# Patient Record
Sex: FEMALE | Race: BLACK OR AFRICAN AMERICAN | NOT HISPANIC OR LATINO
[De-identification: names, ages, dates, MRNs, and addresses within clinical notes are randomized per-mention and may not be internally consistent; named-entity substitution may affect disease eponyms.]

---

## 2021-02-25 PROBLEM — Z00.00 ENCOUNTER FOR PREVENTIVE HEALTH EXAMINATION: Status: ACTIVE | Noted: 2021-02-25

## 2021-03-23 ENCOUNTER — APPOINTMENT (OUTPATIENT)
Dept: VASCULAR SURGERY | Facility: CLINIC | Age: 86
End: 2021-03-23
Payer: MEDICARE

## 2021-03-23 PROCEDURE — 99203 OFFICE O/P NEW LOW 30 MIN: CPT

## 2021-03-23 PROCEDURE — 93986 DUP-SCAN HEMO COMPL UNI STD: CPT

## 2021-03-23 NOTE — PHYSICAL EXAM
[JVD] : no jugular venous distention  [Normal Breath Sounds] : Normal breath sounds [Normal Rate and Rhythm] : normal rate and rhythm [2+] : left 2+ [Ankle Swelling (On Exam)] : not present [Varicose Veins Of Lower Extremities] : not present [] : not present [Abdomen Tenderness] : ~T ~M Abdominal tenderness [No Rash or Lesion] : No rash or lesion [Alert] : alert [Calm] : calm [de-identified] : Appears well

## 2021-03-23 NOTE — ASSESSMENT
[FreeTextEntry1] : Patient underwent a right arm vein mapping which shows a poor cephalic and basilic vein.  Given the patient's age will place a right arm AV graft.

## 2021-03-23 NOTE — HISTORY OF PRESENT ILLNESS
[FreeTextEntry1] : 89-year-old female currently on hemodialysis via right-sided permacath for the past several months presents to the office for consultation for permanent hemodialysis access.  Patient is right-handed however, she has placement of a left-sided pacemaker.

## 2021-04-26 ENCOUNTER — OUTPATIENT (OUTPATIENT)
Dept: OUTPATIENT SERVICES | Facility: HOSPITAL | Age: 86
LOS: 1 days | End: 2021-04-26
Payer: MEDICARE

## 2021-04-26 VITALS
WEIGHT: 145.28 LBS | SYSTOLIC BLOOD PRESSURE: 137 MMHG | OXYGEN SATURATION: 99 % | HEART RATE: 71 BPM | HEIGHT: 59 IN | TEMPERATURE: 98 F | RESPIRATION RATE: 18 BRPM | DIASTOLIC BLOOD PRESSURE: 79 MMHG

## 2021-04-26 DIAGNOSIS — Z98.890 OTHER SPECIFIED POSTPROCEDURAL STATES: Chronic | ICD-10-CM

## 2021-04-26 DIAGNOSIS — N18.6 END STAGE RENAL DISEASE: ICD-10-CM

## 2021-04-26 DIAGNOSIS — I48.91 UNSPECIFIED ATRIAL FIBRILLATION: ICD-10-CM

## 2021-04-26 DIAGNOSIS — E11.9 TYPE 2 DIABETES MELLITUS WITHOUT COMPLICATIONS: ICD-10-CM

## 2021-04-26 DIAGNOSIS — Z01.818 ENCOUNTER FOR OTHER PREPROCEDURAL EXAMINATION: ICD-10-CM

## 2021-04-26 PROCEDURE — G0463: CPT

## 2021-04-26 RX ORDER — SODIUM CHLORIDE 9 MG/ML
3 INJECTION INTRAMUSCULAR; INTRAVENOUS; SUBCUTANEOUS EVERY 8 HOURS
Refills: 0 | Status: DISCONTINUED | OUTPATIENT
Start: 2021-05-05 | End: 2021-05-19

## 2021-04-26 RX ORDER — LIDOCAINE HCL 20 MG/ML
0.2 VIAL (ML) INJECTION ONCE
Refills: 0 | Status: DISCONTINUED | OUTPATIENT
Start: 2021-05-05 | End: 2021-05-19

## 2021-04-26 RX ORDER — CHLORHEXIDINE GLUCONATE 213 G/1000ML
1 SOLUTION TOPICAL ONCE
Refills: 0 | Status: DISCONTINUED | OUTPATIENT
Start: 2021-05-05 | End: 2021-05-19

## 2021-04-26 NOTE — H&P PST ADULT - HISTORY OF PRESENT ILLNESS
This is a 89 y/o female PMH Afib, S/P ablation X 2 ?2010, S/P PPM 2018, chronic CHF, hypertension, diabetes type 2, ESRD, started dialysis in November via vascath.  Presents today for right arm AV graft placement. This is a 89 y/o female PMH Afib, S/P ablation X 2 ?2010, S/P [employer] PPM 2018 (no card), chronic CHF, hypertension, diabetes type 2, ESRD, started dialysis in November via vascath.  Presents today for right arm AV graft placement. This is a 89 y/o female PMH Afib, S/P ablation X 2 ?2010/2011, S/P [employer] PPM 2018 (no card), chronic CHF, hypertension, diabetes type 2, ESRD, started dialysis in November via vascat on Tuesdays, Thursdays and Saturdays.  Presents today for right arm AV graft placement.

## 2021-04-26 NOTE — H&P PST ADULT - NSICDXPASTMEDICALHX_GEN_ALL_CORE_FT
PAST MEDICAL HISTORY:  Atrial fibrillation     ESRD on dialysis     History of chronic CHF     HTN (hypertension)     Type 2 diabetes mellitus

## 2021-04-26 NOTE — H&P PST ADULT - NSICDXPROBLEM_GEN_ALL_CORE_FT
PROBLEM DIAGNOSES  Problem: ESRD on dialysis  Assessment and Plan: Right arm AV graft placement    Problem: DM (diabetes mellitus)  Assessment and Plan: Instructed to hold repaglinide the morning of surgery    Problem: Atrial fibrillation  Assessment and Plan: Will hold eliquis 2 days prior to surgery       PROBLEM DIAGNOSES  Problem: ESRD on dialysis  Assessment and Plan: Right arm AV graft placement, unable to obtain labs at [facility], called HD center to have labs drawn with dialysis    Problem: DM (diabetes mellitus)  Assessment and Plan: Instructed to hold repaglinide the morning of surgery    Problem: Atrial fibrillation  Assessment and Plan: Will hold eliquis 2 days prior to surgery

## 2021-04-28 PROBLEM — N18.6 END STAGE RENAL DISEASE: Chronic | Status: ACTIVE | Noted: 2021-04-26

## 2021-04-28 PROBLEM — E11.9 TYPE 2 DIABETES MELLITUS WITHOUT COMPLICATIONS: Chronic | Status: ACTIVE | Noted: 2021-04-26

## 2021-04-28 PROBLEM — Z86.79 PERSONAL HISTORY OF OTHER DISEASES OF THE CIRCULATORY SYSTEM: Chronic | Status: ACTIVE | Noted: 2021-04-26

## 2021-04-28 PROBLEM — I48.91 UNSPECIFIED ATRIAL FIBRILLATION: Chronic | Status: ACTIVE | Noted: 2021-04-26

## 2021-04-28 PROBLEM — I10 ESSENTIAL (PRIMARY) HYPERTENSION: Chronic | Status: ACTIVE | Noted: 2021-04-26

## 2021-05-02 ENCOUNTER — OUTPATIENT (OUTPATIENT)
Dept: OUTPATIENT SERVICES | Facility: HOSPITAL | Age: 86
LOS: 1 days | End: 2021-05-02
Payer: MEDICARE

## 2021-05-02 DIAGNOSIS — Z98.890 OTHER SPECIFIED POSTPROCEDURAL STATES: Chronic | ICD-10-CM

## 2021-05-02 DIAGNOSIS — Z11.52 ENCOUNTER FOR SCREENING FOR COVID-19: ICD-10-CM

## 2021-05-02 LAB — SARS-COV-2 RNA SPEC QL NAA+PROBE: SIGNIFICANT CHANGE UP

## 2021-05-02 PROCEDURE — C9803: CPT

## 2021-05-02 PROCEDURE — U0005: CPT

## 2021-05-02 PROCEDURE — U0003: CPT

## 2021-05-04 ENCOUNTER — TRANSCRIPTION ENCOUNTER (OUTPATIENT)
Age: 86
End: 2021-05-04

## 2021-05-05 ENCOUNTER — APPOINTMENT (OUTPATIENT)
Dept: VASCULAR SURGERY | Facility: HOSPITAL | Age: 86
End: 2021-05-05
Payer: MEDICARE

## 2021-05-05 ENCOUNTER — OUTPATIENT (OUTPATIENT)
Dept: OUTPATIENT SERVICES | Facility: HOSPITAL | Age: 86
LOS: 1 days | End: 2021-05-05
Payer: SELF-PAY

## 2021-05-05 VITALS
HEIGHT: 59 IN | WEIGHT: 145.28 LBS | OXYGEN SATURATION: 100 % | DIASTOLIC BLOOD PRESSURE: 75 MMHG | TEMPERATURE: 98 F | HEART RATE: 73 BPM | SYSTOLIC BLOOD PRESSURE: 118 MMHG | RESPIRATION RATE: 18 BRPM

## 2021-05-05 VITALS
DIASTOLIC BLOOD PRESSURE: 61 MMHG | SYSTOLIC BLOOD PRESSURE: 130 MMHG | RESPIRATION RATE: 14 BRPM | HEART RATE: 79 BPM | OXYGEN SATURATION: 100 %

## 2021-05-05 DIAGNOSIS — Z98.890 OTHER SPECIFIED POSTPROCEDURAL STATES: Chronic | ICD-10-CM

## 2021-05-05 DIAGNOSIS — N18.6 END STAGE RENAL DISEASE: ICD-10-CM

## 2021-05-05 LAB
BASE EXCESS BLDV CALC-SCNC: 4.6 MMOL/L — HIGH (ref -2–2)
CA-I SERPL-SCNC: 1.14 MMOL/L — SIGNIFICANT CHANGE UP (ref 1.12–1.3)
CHLORIDE BLDV-SCNC: 101 MMOL/L — SIGNIFICANT CHANGE UP (ref 96–108)
CO2 BLDV-SCNC: 33 MMOL/L — HIGH (ref 22–30)
GAS PNL BLDV: 132 MMOL/L — LOW (ref 135–145)
GAS PNL BLDV: SIGNIFICANT CHANGE UP
GAS PNL BLDV: SIGNIFICANT CHANGE UP
GLUCOSE BLDC GLUCOMTR-MCNC: 113 MG/DL — HIGH (ref 70–99)
GLUCOSE BLDC GLUCOMTR-MCNC: 62 MG/DL — LOW (ref 70–99)
GLUCOSE BLDC GLUCOMTR-MCNC: 83 MG/DL — SIGNIFICANT CHANGE UP (ref 70–99)
GLUCOSE BLDC GLUCOMTR-MCNC: 99 MG/DL — SIGNIFICANT CHANGE UP (ref 70–99)
GLUCOSE BLDV-MCNC: 93 MG/DL — SIGNIFICANT CHANGE UP (ref 70–99)
HCO3 BLDV-SCNC: 31 MMOL/L — HIGH (ref 21–29)
HCT VFR BLDA CALC: 34 % — LOW (ref 39–50)
HGB BLD CALC-MCNC: 11.1 G/DL — LOW (ref 11.5–15.5)
LACTATE BLDV-MCNC: 3.3 MMOL/L — HIGH (ref 0.7–2)
OTHER CELLS CSF MANUAL: 5 ML/DL — LOW (ref 18–22)
PCO2 BLDV: 61 MMHG — HIGH (ref 35–50)
PH BLDV: 7.33 — LOW (ref 7.35–7.45)
PO2 BLDV: 25 MMHG — SIGNIFICANT CHANGE UP (ref 25–45)
POTASSIUM BLDV-SCNC: 3.8 MMOL/L — SIGNIFICANT CHANGE UP (ref 3.5–5.3)
SAO2 % BLDV: 32 % — LOW (ref 67–88)

## 2021-05-05 PROCEDURE — 82803 BLOOD GASES ANY COMBINATION: CPT

## 2021-05-05 PROCEDURE — 36825 ARTERY-VEIN AUTOGRAFT: CPT | Mod: RT

## 2021-05-05 PROCEDURE — 82947 ASSAY GLUCOSE BLOOD QUANT: CPT

## 2021-05-05 PROCEDURE — 84132 ASSAY OF SERUM POTASSIUM: CPT

## 2021-05-05 PROCEDURE — 83605 ASSAY OF LACTIC ACID: CPT

## 2021-05-05 PROCEDURE — C1889: CPT

## 2021-05-05 PROCEDURE — 85014 HEMATOCRIT: CPT

## 2021-05-05 PROCEDURE — C1768: CPT

## 2021-05-05 PROCEDURE — 85018 HEMOGLOBIN: CPT

## 2021-05-05 PROCEDURE — 82330 ASSAY OF CALCIUM: CPT

## 2021-05-05 PROCEDURE — 82962 GLUCOSE BLOOD TEST: CPT

## 2021-05-05 PROCEDURE — 36830 ARTERY-VEIN NONAUTOGRAFT: CPT

## 2021-05-05 PROCEDURE — 84295 ASSAY OF SERUM SODIUM: CPT

## 2021-05-05 PROCEDURE — 82435 ASSAY OF BLOOD CHLORIDE: CPT

## 2021-05-05 RX ORDER — OXYCODONE HYDROCHLORIDE 5 MG/1
1 TABLET ORAL
Qty: 6 | Refills: 0
Start: 2021-05-05

## 2021-05-05 RX ORDER — MIRTAZAPINE 45 MG/1
1 TABLET, ORALLY DISINTEGRATING ORAL
Qty: 0 | Refills: 0 | DISCHARGE

## 2021-05-05 RX ORDER — FOLIC ACID 0.8 MG
1 TABLET ORAL
Qty: 0 | Refills: 0 | DISCHARGE

## 2021-05-05 RX ORDER — FERROUS SULFATE 325(65) MG
1 TABLET ORAL
Qty: 0 | Refills: 0 | DISCHARGE

## 2021-05-05 RX ORDER — REPAGLINIDE 1 MG/1
1 TABLET ORAL
Qty: 0 | Refills: 0 | DISCHARGE

## 2021-05-05 RX ORDER — APIXABAN 2.5 MG/1
1 TABLET, FILM COATED ORAL
Qty: 0 | Refills: 0 | DISCHARGE

## 2021-05-05 RX ORDER — QUETIAPINE FUMARATE 200 MG/1
1 TABLET, FILM COATED ORAL
Qty: 0 | Refills: 0 | DISCHARGE

## 2021-05-05 RX ORDER — ISOSORBIDE MONONITRATE 60 MG/1
1 TABLET, EXTENDED RELEASE ORAL
Qty: 0 | Refills: 0 | DISCHARGE

## 2021-05-05 RX ORDER — CARVEDILOL PHOSPHATE 80 MG/1
1 CAPSULE, EXTENDED RELEASE ORAL
Qty: 0 | Refills: 0 | DISCHARGE

## 2021-05-05 RX ORDER — FUROSEMIDE 40 MG
1 TABLET ORAL
Qty: 0 | Refills: 0 | DISCHARGE

## 2021-05-05 RX ORDER — PANTOPRAZOLE SODIUM 20 MG/1
1 TABLET, DELAYED RELEASE ORAL
Qty: 0 | Refills: 0 | DISCHARGE

## 2021-05-05 NOTE — ASU DISCHARGE PLAN (ADULT/PEDIATRIC) - ASU DC SPECIAL INSTRUCTIONSFT
Please take Tylenol 1000mg every 6 hours for pain. If pain not controlled by Tylenol you may take OXycodone 5mg every 6 hours as needed.     Please call Dr. Priest's office to make/confirm appointment

## 2021-05-05 NOTE — BRIEF OPERATIVE NOTE - NSICDXBRIEFPROCEDURE_GEN_ALL_CORE_FT
PROCEDURES:  Creation of arteriovenous fistula using synthetic graft 05-May-2021 14:29:39 R brachial axillary arteriovenous graft Hiram Navas

## 2021-05-05 NOTE — ASU DISCHARGE PLAN (ADULT/PEDIATRIC) - CARE PROVIDER_API CALL
Jagdish Priest)  Vascular Surgery  [address] [facility], [address]  [place] [address]  Phone: [phone]  Fax: [phone]  Follow Up Time: 2 weeks

## 2021-05-12 ENCOUNTER — APPOINTMENT (OUTPATIENT)
Dept: ENDOVASCULAR SURGERY | Facility: CLINIC | Age: 86
End: 2021-05-12

## 2021-05-19 ENCOUNTER — TRANSCRIPTION ENCOUNTER (OUTPATIENT)
Age: 86
End: 2021-05-19

## 2021-05-19 ENCOUNTER — APPOINTMENT (OUTPATIENT)
Dept: VASCULAR SURGERY | Facility: CLINIC | Age: 86
End: 2021-05-19
Payer: MEDICARE

## 2021-05-19 VITALS — TEMPERATURE: 96.9 F

## 2021-05-19 PROCEDURE — 99024 POSTOP FOLLOW-UP VISIT: CPT

## 2021-05-19 PROCEDURE — 93990 DOPPLER FLOW TESTING: CPT

## 2021-05-19 NOTE — REASON FOR VISIT
[de-identified] : right upper extremity avg  [de-identified] : 5/5/21 [de-identified] : pt notes some edema of the right upper extremity \par

## 2021-05-19 NOTE — PHYSICAL EXAM
[JVD] : no jugular venous distention  [Alert] : alert [Calm] : calm [de-identified] : appears well  [de-identified] : incisions healed, erythema, warmth and edema over the tunnel site

## 2021-05-19 NOTE — DISCUSSION/SUMMARY
[FreeTextEntry1] : 89 yo female with history of esrd on hd via right ij [place] presents for follow up status post right upper extremity avg with erythema and edema in addition to warmth over the tunneled graft site \par duplex shows patent graft with 50-75% stenosis at the distal anastomosis site and flow rate of 581 \par \par will give vanco 1 gm after hd x 3 (for one week) \par pt to follow up in 1-2 weeks \par

## 2021-05-25 ENCOUNTER — APPOINTMENT (OUTPATIENT)
Dept: VASCULAR SURGERY | Facility: CLINIC | Age: 86
End: 2021-05-25
Payer: MEDICARE

## 2021-05-25 PROCEDURE — 99024 POSTOP FOLLOW-UP VISIT: CPT

## 2021-05-25 NOTE — DISCUSSION/SUMMARY
[FreeTextEntry1] : 89 yo female with history of esrd on hd via right ij permcath presents for follow up status post right upper extremity avg\par duplex shows patent graft with 50-75% stenosis at the distal anastomosis site and flow rate of 581 \par \par may use AVG next week\par

## 2021-05-25 NOTE — PHYSICAL EXAM
[JVD] : no jugular venous distention  [Alert] : alert [Calm] : calm [de-identified] : appears well  [de-identified] : incisions healed, erythema, warmth and edema over the tunnel site

## 2021-05-25 NOTE — REASON FOR VISIT
[de-identified] : right upper extremity avg  [de-identified] : 5/5/21 [de-identified] : pt notes some edema of the right upper extremity \par

## 2021-06-18 ENCOUNTER — APPOINTMENT (OUTPATIENT)
Dept: ENDOVASCULAR SURGERY | Facility: CLINIC | Age: 86
End: 2021-06-18
Payer: MEDICARE

## 2021-06-18 PROCEDURE — 36589 REMOVAL TUNNELED CV CATH: CPT | Mod: 58

## 2021-06-30 ENCOUNTER — APPOINTMENT (OUTPATIENT)
Dept: ENDOVASCULAR SURGERY | Facility: CLINIC | Age: 86
End: 2021-06-30
Payer: MEDICARE

## 2021-06-30 ENCOUNTER — RESULT REVIEW (OUTPATIENT)
Age: 86
End: 2021-06-30

## 2021-06-30 VITALS
OXYGEN SATURATION: 98 % | HEART RATE: 100 BPM | TEMPERATURE: 97.6 F | DIASTOLIC BLOOD PRESSURE: 76 MMHG | BODY MASS INDEX: 24.84 KG/M2 | RESPIRATION RATE: 16 BRPM | SYSTOLIC BLOOD PRESSURE: 127 MMHG | HEIGHT: 62 IN | WEIGHT: 135 LBS

## 2021-06-30 DIAGNOSIS — I50.9 HEART FAILURE, UNSPECIFIED: ICD-10-CM

## 2021-06-30 DIAGNOSIS — T82.49XA OTHER COMPLICATION OF VASCULAR DIALYSIS CATHETER, INITIAL ENCOUNTER: ICD-10-CM

## 2021-06-30 DIAGNOSIS — I10 ESSENTIAL (PRIMARY) HYPERTENSION: ICD-10-CM

## 2021-06-30 DIAGNOSIS — I48.91 UNSPECIFIED ATRIAL FIBRILLATION: ICD-10-CM

## 2021-06-30 DIAGNOSIS — E11.9 TYPE 2 DIABETES MELLITUS W/OUT COMPLICATIONS: ICD-10-CM

## 2021-06-30 DIAGNOSIS — N18.6 END STAGE RENAL DISEASE: ICD-10-CM

## 2021-06-30 PROCEDURE — 36215Z: CUSTOM | Mod: 59,58

## 2021-06-30 PROCEDURE — 36905Z: CUSTOM | Mod: 58

## 2021-06-30 RX ORDER — APIXABAN 2.5 MG/1
2.5 TABLET, FILM COATED ORAL
Refills: 0 | Status: ACTIVE | COMMUNITY

## 2021-06-30 RX ORDER — PANTOPRAZOLE SODIUM 40 MG/10ML
40 INJECTION, POWDER, FOR SOLUTION INTRAVENOUS
Refills: 0 | Status: ACTIVE | COMMUNITY

## 2021-06-30 RX ORDER — FERROUS SULFATE 325(65) MG
325 TABLET ORAL
Refills: 0 | Status: ACTIVE | COMMUNITY

## 2021-06-30 RX ORDER — LORAZEPAM 0.5 MG/1
0.5 TABLET ORAL
Refills: 0 | Status: ACTIVE | COMMUNITY

## 2021-06-30 RX ORDER — MIRTAZAPINE 15 MG/1
15 TABLET, FILM COATED ORAL
Refills: 0 | Status: ACTIVE | COMMUNITY

## 2021-06-30 RX ORDER — CARVEDILOL 12.5 MG/1
12.5 TABLET, FILM COATED ORAL
Refills: 0 | Status: ACTIVE | COMMUNITY

## 2021-06-30 RX ORDER — QUETIAPINE 150 MG/1
TABLET, EXTENDED RELEASE ORAL
Refills: 0 | Status: ACTIVE | COMMUNITY

## 2021-06-30 RX ORDER — FUROSEMIDE 40 MG/1
40 TABLET ORAL
Refills: 0 | Status: ACTIVE | COMMUNITY

## 2021-06-30 RX ORDER — FOLIC ACID 1 MG/1
1 TABLET ORAL
Refills: 0 | Status: ACTIVE | COMMUNITY

## 2021-06-30 RX ORDER — ENALAPRIL MALEATE 5 MG/1
5 TABLET ORAL
Refills: 0 | Status: ACTIVE | COMMUNITY

## 2021-06-30 RX ORDER — ISOSORBIDE MONONITRATE 60 MG
60 TABLET, EXTENDED RELEASE 24 HR ORAL
Refills: 0 | Status: ACTIVE | COMMUNITY

## 2021-06-30 RX ORDER — REPAGLINIDE 0.5 MG/1
0.5 TABLET ORAL
Refills: 0 | Status: ACTIVE | COMMUNITY

## 2021-07-14 NOTE — HISTORY OF PRESENT ILLNESS
[] : in right upper arm [FreeTextEntry1] : accompanied by daughter Andria [phone]\par covid vaccinated\par feels ok\par took Eliquis last night, no meds this morning [FreeTextEntry3] : 5/5/2021 Dr. Priest [FreeTextEntry4] : Saturday 6/26/2021 [FreeTextEntry5] : 6/29/2021 6pm [FreeTextEntry6] : Dr. Sanchez

## 2021-07-14 NOTE — PAST MEDICAL HISTORY
[Increasing age ( >40 years old)] : Increasing age ( >40 years old) [Congestive Heart Failure] : Congestive Heart Failure [No therapy indicated for cases scheduled for less than one hour] : No therapy indicated for cases scheduled for less than one hour. [Altered mobility] : Altered mobility [FreeTextEntry1] : Malignant Hyperthermia Screening Tool and Risk of Bleeding Assessment\par \par Ms. MARCIO CHAVIS denies family history of unexpected death following Anesthesia or Exercise.\par Denies Family history of Malignant Hyperthermia, Muscle or Neuromuscular disorder and High Temperature following exercise.\par \par Ms. MARCIO CHAVIS denies history of Muscle Spasm, Dark or Chocolate - Colored urine and Unanticipated fever immediately following anesthesia or serious exercise. \par Ms. CHAVIS also denies bleeding tendencies/ Risks of Bleeding.\par

## 2021-07-14 NOTE — PROCEDURE
[D/C IV on discharge] : D/C IV on discharge [Resume diet] : resume diet [Site check for bleeding/hematoma/thrill/bruit] : Site check for bleeding/hematoma/thrill/bruit [Vital signs on admission the q 15 mins x2] : Vital signs on admission the q 15 mins x2 [FreeTextEntry1] : right arm AVG thrombectomy [FreeTextEntry3] : TPA 2mg/3ml sterile water instilled into right arm AVG via 15 cm speedlyser at 1130 by Dr. Damon

## 2021-08-31 ENCOUNTER — APPOINTMENT (OUTPATIENT)
Dept: VASCULAR SURGERY | Facility: CLINIC | Age: 86
End: 2021-08-31
Payer: MEDICARE

## 2021-08-31 VITALS — DIASTOLIC BLOOD PRESSURE: 88 MMHG | HEART RATE: 108 BPM | SYSTOLIC BLOOD PRESSURE: 172 MMHG

## 2021-08-31 PROCEDURE — 99213 OFFICE O/P EST LOW 20 MIN: CPT

## 2021-08-31 PROCEDURE — 93990 DOPPLER FLOW TESTING: CPT

## 2021-08-31 NOTE — DISCUSSION/SUMMARY
[FreeTextEntry1] : The patient underwent a right graft duplex which shows a patent graft.  There is a moderate outflow stenosis.  Patient with good flow.  No need for intervention at this time.

## 2021-08-31 NOTE — HISTORY OF PRESENT ILLNESS
[FreeTextEntry1] : 89-year-old female currently on hemodialysis via right arm AV graft.\par Patient with no complaints [] : in right upper arm

## 2021-08-31 NOTE — PHYSICAL EXAM
[Thrill] : thrill [Pulsatile Thrill] : no pulsatile thrill [Aneurysm] : no aneurysm [Hand well perfused] : hand well perfused [Warm Extremities] : warm extremities [2+] : left 2+ [Normal] : normoactive bowel sounds, soft and nontender, no hepatosplenomegaly or masses appreciated

## 2021-09-22 NOTE — PAST MEDICAL HISTORY
[Increasing age ( >40 years old)] : Increasing age ( >40 years old) [Altered mobility] : Altered mobility [Congestive Heart Failure] : Congestive Heart Failure [No therapy indicated for cases scheduled for less than one hour] : No therapy indicated for cases scheduled for less than one hour. [FreeTextEntry1] : Malignant Hyperthermia Screening Tool and Risk of Bleeding Assessment \par \par MARCIO CHAVIS  denies family of unexpected death following Anesthesia or Exercise.\par Denies family history of Malignant Hyperthermia, Muscle or Neuromuscular disorder and High Temperature following exercise. \par \par MARCIO CHAVIS Patient denies history of Muscle Spasm, Dark or Chocolate- Colored and unanticipated fever immediately following anesthesia or serious exercise. \par \par MARCIO CHAVIS Patient also denies bleeding tendencies/risks of bleeding. \par

## 2021-09-22 NOTE — HISTORY OF PRESENT ILLNESS
[FreeTextEntry1] : Accompanied by \par Alert and orientated x 3\par Fully Covid vaccinated \par Covid not detected -- \par Medications taken today/Eliquis? \par No falls reported\par  [] : in right upper arm [FreeTextEntry3] : 5/5/2021 Dr. Priest [FreeTextEntry5] : Yesterday  [FreeTextEntry6] : Dr. Sanchez

## 2021-09-22 NOTE — PROCEDURE
[FreeTextEntry1] : right arm AVG  [D/C IV on discharge] : D/C IV on discharge [Resume diet] : resume diet [Site check for bleeding/hematoma/thrill/bruit] : Site check for bleeding/hematoma/thrill/bruit [Vital signs on admission the q 15 mins x2] : Vital signs on admission the q 15 mins x2

## 2021-09-23 ENCOUNTER — APPOINTMENT (OUTPATIENT)
Dept: ENDOVASCULAR SURGERY | Facility: CLINIC | Age: 86
End: 2021-09-23

## 2021-12-02 ENCOUNTER — APPOINTMENT (OUTPATIENT)
Dept: VASCULAR SURGERY | Facility: CLINIC | Age: 86
End: 2021-12-02

## 2023-02-21 NOTE — H&P PST ADULT - MS GEN HX ROS MEA POS PC
muscle weakness Show Applicator Variable?: Yes Duration Of Freeze Thaw-Cycle (Seconds): 0 Post-Care Instructions: I reviewed with the patient in detail post-care instructions. Patient is to wear sunprotection, and avoid picking at any of the treated lesions. Pt may apply Vaseline to crusted or scabbing areas. Detail Level: Detailed Consent: The patient's consent was obtained including but not limited to risks of crusting, scabbing, blistering, scarring, darker or lighter pigmentary change, recurrence, incomplete removal and infection. Render Note In Bullet Format When Appropriate: No

## 2025-05-20 NOTE — H&P PST ADULT - DOES THIS PATIENT HAVE A HISTORY OF OR HAS BEEN DX WITH HEART FAILURE?
"Goal Outcome Evaluation:           Outcome Evaluation: Dischare home today    Vitals: BP (!) 155/90   Pulse 90   Temp 98.2  F (36.8  C) (Oral)   Resp 16   Ht 1.727 m (5' 8\")   Wt 71.4 kg (157 lb 8 oz)   SpO2 96%   BMI 23.95 kg/m      Endocrine: n/a  Labs: Stable labs.  Pain: RLQ abdominal incisional pain.  PRN's: Oxycodone, Robaxin, MOM, Dulcolax suppository, Zofran ODT.  Diet: Regular diet, eats small meals.  LDA: TL internal jugular removed. R RANDALL (will DC home with).   GI: Passing gas, no BM post surgery. Small emesis, complains of reflux.  : Hu DC'd at 11 AM. 100cc void, 0cc PVR.  Skin: Abdominal incision with staples.  Neuro: Alert and oriented, very sleepy after Pentamidine treatment.   Mobility: Up ad reyna.  Education: Medication card and lab book updated. RANDALL supplies and home cares reviewed with the patient. Needs discharge orders and medication care reviewed.   Plan: Discharge locally with family. Report called to [facility].            " no